# Patient Record
Sex: FEMALE | Race: WHITE | NOT HISPANIC OR LATINO
[De-identification: names, ages, dates, MRNs, and addresses within clinical notes are randomized per-mention and may not be internally consistent; named-entity substitution may affect disease eponyms.]

---

## 2017-09-12 ENCOUNTER — RESULT REVIEW (OUTPATIENT)
Age: 47
End: 2017-09-12

## 2018-10-09 ENCOUNTER — OUTPATIENT (OUTPATIENT)
Dept: OUTPATIENT SERVICES | Facility: HOSPITAL | Age: 48
LOS: 1 days | Discharge: HOME | End: 2018-10-09
Payer: COMMERCIAL

## 2018-10-09 VITALS
OXYGEN SATURATION: 99 % | RESPIRATION RATE: 18 BRPM | SYSTOLIC BLOOD PRESSURE: 120 MMHG | HEART RATE: 83 BPM | DIASTOLIC BLOOD PRESSURE: 85 MMHG | HEIGHT: 67 IN | WEIGHT: 187.17 LBS | TEMPERATURE: 98 F

## 2018-10-09 DIAGNOSIS — G95.89 OTHER SPECIFIED DISEASES OF SPINAL CORD: ICD-10-CM

## 2018-10-09 DIAGNOSIS — Z01.818 ENCOUNTER FOR OTHER PREPROCEDURAL EXAMINATION: ICD-10-CM

## 2018-10-09 DIAGNOSIS — Z90.49 ACQUIRED ABSENCE OF OTHER SPECIFIED PARTS OF DIGESTIVE TRACT: Chronic | ICD-10-CM

## 2018-10-09 DIAGNOSIS — J45.909 UNSPECIFIED ASTHMA, UNCOMPLICATED: ICD-10-CM

## 2018-10-09 DIAGNOSIS — Z98.890 OTHER SPECIFIED POSTPROCEDURAL STATES: Chronic | ICD-10-CM

## 2018-10-09 LAB
ANION GAP SERPL CALC-SCNC: 11 MMOL/L — SIGNIFICANT CHANGE UP (ref 5–17)
APTT BLD: 32.2 SEC — SIGNIFICANT CHANGE UP (ref 27.5–37.4)
BASOPHILS # BLD AUTO: 0.02 K/UL — SIGNIFICANT CHANGE UP (ref 0–0.2)
BASOPHILS NFR BLD AUTO: 0.2 % — SIGNIFICANT CHANGE UP (ref 0–2)
BLD GP AB SCN SERPL QL: SIGNIFICANT CHANGE UP
BUN SERPL-MCNC: 11 MG/DL — SIGNIFICANT CHANGE UP (ref 7–23)
CALCIUM SERPL-MCNC: 8.5 MG/DL — SIGNIFICANT CHANGE UP (ref 8.5–10.1)
CHLORIDE SERPL-SCNC: 106 MMOL/L — SIGNIFICANT CHANGE UP (ref 96–108)
CO2 SERPL-SCNC: 23 MMOL/L — SIGNIFICANT CHANGE UP (ref 22–31)
CREAT SERPL-MCNC: 0.58 MG/DL — SIGNIFICANT CHANGE UP (ref 0.5–1.3)
EOSINOPHIL # BLD AUTO: 0.25 K/UL — SIGNIFICANT CHANGE UP (ref 0–0.5)
EOSINOPHIL NFR BLD AUTO: 3 % — SIGNIFICANT CHANGE UP (ref 0–6)
GLUCOSE SERPL-MCNC: 104 MG/DL — HIGH (ref 70–99)
HBA1C BLD-MCNC: 5.6 % — SIGNIFICANT CHANGE UP (ref 4–5.6)
HCG UR QL: NEGATIVE — SIGNIFICANT CHANGE UP
HCT VFR BLD CALC: 39.3 % — SIGNIFICANT CHANGE UP (ref 34.5–45)
HGB BLD-MCNC: 13 G/DL — SIGNIFICANT CHANGE UP (ref 11.5–15.5)
IMM GRANULOCYTES NFR BLD AUTO: 0.1 % — SIGNIFICANT CHANGE UP (ref 0–1.5)
INR BLD: 0.96 RATIO — SIGNIFICANT CHANGE UP (ref 0.88–1.16)
LYMPHOCYTES # BLD AUTO: 1.58 K/UL — SIGNIFICANT CHANGE UP (ref 1–3.3)
LYMPHOCYTES # BLD AUTO: 19.1 % — SIGNIFICANT CHANGE UP (ref 13–44)
MCHC RBC-ENTMCNC: 32.5 PG — SIGNIFICANT CHANGE UP (ref 27–34)
MCHC RBC-ENTMCNC: 33.1 GM/DL — SIGNIFICANT CHANGE UP (ref 32–36)
MCV RBC AUTO: 98.3 FL — SIGNIFICANT CHANGE UP (ref 80–100)
MONOCYTES # BLD AUTO: 0.43 K/UL — SIGNIFICANT CHANGE UP (ref 0–0.9)
MONOCYTES NFR BLD AUTO: 5.2 % — SIGNIFICANT CHANGE UP (ref 2–14)
MRSA PCR RESULT.: SIGNIFICANT CHANGE UP
NEUTROPHILS # BLD AUTO: 5.97 K/UL — SIGNIFICANT CHANGE UP (ref 1.8–7.4)
NEUTROPHILS NFR BLD AUTO: 72.4 % — SIGNIFICANT CHANGE UP (ref 43–77)
PLATELET # BLD AUTO: 456 K/UL — HIGH (ref 150–400)
POTASSIUM SERPL-MCNC: 3.9 MMOL/L — SIGNIFICANT CHANGE UP (ref 3.5–5.3)
POTASSIUM SERPL-SCNC: 3.9 MMOL/L — SIGNIFICANT CHANGE UP (ref 3.5–5.3)
PROTHROM AB SERPL-ACNC: 10.5 SEC — SIGNIFICANT CHANGE UP (ref 9.8–12.7)
RBC # BLD: 4 M/UL — SIGNIFICANT CHANGE UP (ref 3.8–5.2)
RBC # FLD: 12.5 % — SIGNIFICANT CHANGE UP (ref 10.3–14.5)
S AUREUS DNA NOSE QL NAA+PROBE: SIGNIFICANT CHANGE UP
SODIUM SERPL-SCNC: 140 MMOL/L — SIGNIFICANT CHANGE UP (ref 135–145)
WBC # BLD: 8.26 K/UL — SIGNIFICANT CHANGE UP (ref 3.8–10.5)
WBC # FLD AUTO: 8.26 K/UL — SIGNIFICANT CHANGE UP (ref 3.8–10.5)

## 2018-10-09 PROCEDURE — 93010 ELECTROCARDIOGRAM REPORT: CPT | Mod: NC

## 2018-10-09 RX ORDER — MUPIROCIN 20 MG/G
1 OINTMENT TOPICAL
Qty: 22 | Refills: 0 | OUTPATIENT
Start: 2018-10-09 | End: 2018-10-13

## 2018-10-09 NOTE — H&P PST ADULT - NSANTHOSAYNRD_GEN_A_CORE
No. PATITO screening performed.  STOP BANG Legend: 0-2 = LOW Risk; 3-4 = INTERMEDIATE Risk; 5-8 = HIGH Risk

## 2018-10-09 NOTE — H&P PST ADULT - HISTORY OF PRESENT ILLNESS
48F no pmhx c/o b/l hand numbness here for PST for scheduled Anterior cervical discectomy 48F pmh asthma, c/o b/l hand numbness here for PST for scheduled Anterior cervical discectomy

## 2018-10-09 NOTE — H&P PST ADULT - ASSESSMENT
48F pmh asthma, c/o b/l hand numbness here for PST for scheduled Anterior cervical discectomy  CAPRINI SCORE    AGE RELATED RISK FACTORS                                                       MOBILITY RELATED FACTORS  [x ] Age 41-60 years                                            (1 Point)                  [ ] Bed rest                                                        (1 Point)  [ ] Age: 61-74 years                                           (2 Points)                [ ] Plaster cast                                                   (2 Points)  [ ] Age= 75 years                                              (3 Points)                 [ ] Bed bound for more than 72 hours                   (2 Points)    DISEASE RELATED RISK FACTORS                                               GENDER SPECIFIC FACTORS  [ ] Edema in the lower extremities                       (1 Point)                  [ ] Pregnancy                                                     (1 Point)  [ ] Varicose veins                                               (1 Point)                  [ ] Post-partum < 6 weeks                                   (1 Point)             [x ] BMI > 25 Kg/m2                                            (1 Point)                  [ ] Hormonal therapy  or oral contraception            (1 Point)                 [ ] Sepsis (in the previous month)                        (1 Point)                  [ ] History of pregnancy complications  [ ] Pneumonia or serious lung disease                                               [ ] Unexplained or recurrent                       (1 Point)           (in the previous month)                               (1 Point)  [ ] Abnormal pulmonary function test                     (1 Point)                 SURGERY RELATED RISK FACTORS  [ ] Acute myocardial infarction                              (1 Point)                 [ ]  Section                                            (1 Point)  [ ] Congestive heart failure (in the previous month)  (1 Point)                 [ ] Minor surgery                                                 (1 Point)   [ ] Inflammatory bowel disease                             (1 Point)                 [ ] Arthroscopic surgery                                        (2 Points)  [ ] Central venous access                                    (2 Points)                [x ] General surgery lasting more than 45 minutes   (2 Points)       [ ] Stroke (in the previous month)                          (5 Points)               [ ] Elective arthroplasty                                        (5 Points)                                                                                                                                               HEMATOLOGY RELATED FACTORS                                                 TRAUMA RELATED RISK FACTORS  [ ] Prior episodes of VTE                                     (3 Points)                 [ ] Fracture of the hip, pelvis, or leg                       (5 Points)  [ ] Positive family history for VTE                         (3 Points)                 [ ] Acute spinal cord injury (in the previous month)  (5 Points)  [ ] Prothrombin 70548 A                                      (3 Points)                 [ ] Paralysis  (less than 1 month)                          (5 Points)  [ ] Factor V Leiden                                             (3 Points)                 [ ] Multiple Trauma within 1 month                         (5 Points)  [ ] Lupus anticoagulants                                     (3 Points)                                                           [ ] Anticardiolipin antibodies                                (3 Points)                                                       [ ] High homocysteine in the blood                      (3 Points)                                             [ ] Other congenital or acquired thrombophilia       (3 Points)                                                [ ] Heparin induced thrombocytopenia                  (3 Points)                                          Total Score [       4   ]

## 2018-10-10 ENCOUNTER — TRANSCRIPTION ENCOUNTER (OUTPATIENT)
Age: 48
End: 2018-10-10

## 2018-10-11 ENCOUNTER — INPATIENT (INPATIENT)
Facility: HOSPITAL | Age: 48
LOS: 0 days | Discharge: HOME | End: 2018-10-12
Attending: ORTHOPAEDIC SURGERY | Admitting: ORTHOPAEDIC SURGERY

## 2018-10-11 ENCOUNTER — TRANSCRIPTION ENCOUNTER (OUTPATIENT)
Age: 48
End: 2018-10-11

## 2018-10-11 VITALS
SYSTOLIC BLOOD PRESSURE: 129 MMHG | TEMPERATURE: 98 F | RESPIRATION RATE: 16 BRPM | DIASTOLIC BLOOD PRESSURE: 82 MMHG | HEIGHT: 67 IN | WEIGHT: 187.17 LBS | OXYGEN SATURATION: 97 % | HEART RATE: 93 BPM

## 2018-10-11 DIAGNOSIS — Z90.49 ACQUIRED ABSENCE OF OTHER SPECIFIED PARTS OF DIGESTIVE TRACT: Chronic | ICD-10-CM

## 2018-10-11 DIAGNOSIS — Z98.890 OTHER SPECIFIED POSTPROCEDURAL STATES: Chronic | ICD-10-CM

## 2018-10-11 LAB
ANION GAP SERPL CALC-SCNC: 10 MMOL/L — SIGNIFICANT CHANGE UP (ref 5–17)
BASOPHILS # BLD AUTO: 0.03 K/UL — SIGNIFICANT CHANGE UP (ref 0–0.2)
BASOPHILS NFR BLD AUTO: 0.2 % — SIGNIFICANT CHANGE UP (ref 0–2)
BLD GP AB SCN SERPL QL: SIGNIFICANT CHANGE UP
BUN SERPL-MCNC: 10 MG/DL — SIGNIFICANT CHANGE UP (ref 7–23)
CALCIUM SERPL-MCNC: 8.1 MG/DL — LOW (ref 8.5–10.1)
CHLORIDE SERPL-SCNC: 109 MMOL/L — HIGH (ref 96–108)
CO2 SERPL-SCNC: 24 MMOL/L — SIGNIFICANT CHANGE UP (ref 22–31)
CREAT SERPL-MCNC: 0.84 MG/DL — SIGNIFICANT CHANGE UP (ref 0.5–1.3)
EOSINOPHIL # BLD AUTO: 0.06 K/UL — SIGNIFICANT CHANGE UP (ref 0–0.5)
EOSINOPHIL NFR BLD AUTO: 0.4 % — SIGNIFICANT CHANGE UP (ref 0–6)
GLUCOSE SERPL-MCNC: 127 MG/DL — HIGH (ref 70–99)
HCT VFR BLD CALC: 37.8 % — SIGNIFICANT CHANGE UP (ref 34.5–45)
HGB BLD-MCNC: 12.7 G/DL — SIGNIFICANT CHANGE UP (ref 11.5–15.5)
IMM GRANULOCYTES NFR BLD AUTO: 0.5 % — SIGNIFICANT CHANGE UP (ref 0–1.5)
LYMPHOCYTES # BLD AUTO: 0.81 K/UL — LOW (ref 1–3.3)
LYMPHOCYTES # BLD AUTO: 5.4 % — LOW (ref 13–44)
MCHC RBC-ENTMCNC: 32.3 PG — SIGNIFICANT CHANGE UP (ref 27–34)
MCHC RBC-ENTMCNC: 33.6 GM/DL — SIGNIFICANT CHANGE UP (ref 32–36)
MCV RBC AUTO: 96.2 FL — SIGNIFICANT CHANGE UP (ref 80–100)
MONOCYTES # BLD AUTO: 0.19 K/UL — SIGNIFICANT CHANGE UP (ref 0–0.9)
MONOCYTES NFR BLD AUTO: 1.3 % — LOW (ref 2–14)
NEUTROPHILS # BLD AUTO: 13.97 K/UL — HIGH (ref 1.8–7.4)
NEUTROPHILS NFR BLD AUTO: 92.2 % — HIGH (ref 43–77)
PLATELET # BLD AUTO: 419 K/UL — HIGH (ref 150–400)
POTASSIUM SERPL-MCNC: 3.9 MMOL/L — SIGNIFICANT CHANGE UP (ref 3.5–5.3)
POTASSIUM SERPL-SCNC: 3.9 MMOL/L — SIGNIFICANT CHANGE UP (ref 3.5–5.3)
RBC # BLD: 3.93 M/UL — SIGNIFICANT CHANGE UP (ref 3.8–5.2)
RBC # FLD: 11.9 % — SIGNIFICANT CHANGE UP (ref 10.3–14.5)
SODIUM SERPL-SCNC: 143 MMOL/L — SIGNIFICANT CHANGE UP (ref 135–145)
WBC # BLD: 15.13 K/UL — HIGH (ref 3.8–10.5)
WBC # FLD AUTO: 15.13 K/UL — HIGH (ref 3.8–10.5)

## 2018-10-11 RX ORDER — MORPHINE SULFATE 50 MG/1
4 CAPSULE, EXTENDED RELEASE ORAL
Qty: 0 | Refills: 0 | Status: DISCONTINUED | OUTPATIENT
Start: 2018-10-11 | End: 2018-10-11

## 2018-10-11 RX ORDER — OXYCODONE AND ACETAMINOPHEN 5; 325 MG/1; MG/1
1 TABLET ORAL EVERY 4 HOURS
Qty: 0 | Refills: 0 | Status: DISCONTINUED | OUTPATIENT
Start: 2018-10-11 | End: 2018-10-11

## 2018-10-11 RX ORDER — ASCORBIC ACID 60 MG
500 TABLET,CHEWABLE ORAL
Qty: 0 | Refills: 0 | Status: DISCONTINUED | OUTPATIENT
Start: 2018-10-11 | End: 2018-10-12

## 2018-10-11 RX ORDER — FOLIC ACID 0.8 MG
1 TABLET ORAL DAILY
Qty: 0 | Refills: 0 | Status: DISCONTINUED | OUTPATIENT
Start: 2018-10-11 | End: 2018-10-12

## 2018-10-11 RX ORDER — ACETAMINOPHEN 500 MG
1000 TABLET ORAL ONCE
Qty: 0 | Refills: 0 | Status: COMPLETED | OUTPATIENT
Start: 2018-10-11 | End: 2018-10-11

## 2018-10-11 RX ORDER — SODIUM CHLORIDE 9 MG/ML
1000 INJECTION, SOLUTION INTRAVENOUS
Qty: 0 | Refills: 0 | Status: DISCONTINUED | OUTPATIENT
Start: 2018-10-11 | End: 2018-10-11

## 2018-10-11 RX ORDER — ONDANSETRON 8 MG/1
4 TABLET, FILM COATED ORAL ONCE
Qty: 0 | Refills: 0 | Status: DISCONTINUED | OUTPATIENT
Start: 2018-10-11 | End: 2018-10-11

## 2018-10-11 RX ORDER — OXYCODONE HYDROCHLORIDE 5 MG/1
5 TABLET ORAL EVERY 4 HOURS
Qty: 0 | Refills: 0 | Status: DISCONTINUED | OUTPATIENT
Start: 2018-10-11 | End: 2018-10-12

## 2018-10-11 RX ORDER — LORATADINE 10 MG/1
10 TABLET ORAL DAILY
Qty: 0 | Refills: 0 | Status: DISCONTINUED | OUTPATIENT
Start: 2018-10-11 | End: 2018-10-12

## 2018-10-11 RX ORDER — OXYCODONE HYDROCHLORIDE 5 MG/1
10 TABLET ORAL EVERY 4 HOURS
Qty: 0 | Refills: 0 | Status: DISCONTINUED | OUTPATIENT
Start: 2018-10-11 | End: 2018-10-12

## 2018-10-11 RX ORDER — CEFAZOLIN SODIUM 1 G
2000 VIAL (EA) INJECTION EVERY 8 HOURS
Qty: 0 | Refills: 0 | Status: COMPLETED | OUTPATIENT
Start: 2018-10-11 | End: 2018-10-12

## 2018-10-11 RX ORDER — ALBUTEROL 90 UG/1
1 AEROSOL, METERED ORAL EVERY 6 HOURS
Qty: 0 | Refills: 0 | Status: DISCONTINUED | OUTPATIENT
Start: 2018-10-11 | End: 2018-10-12

## 2018-10-11 RX ORDER — BACITRACIN ZINC 500 UNIT/G
1 OINTMENT IN PACKET (EA) TOPICAL
Qty: 0 | Refills: 0 | Status: DISCONTINUED | OUTPATIENT
Start: 2018-10-11 | End: 2018-10-12

## 2018-10-11 RX ORDER — HYDROMORPHONE HYDROCHLORIDE 2 MG/ML
0.5 INJECTION INTRAMUSCULAR; INTRAVENOUS; SUBCUTANEOUS
Qty: 0 | Refills: 0 | Status: DISCONTINUED | OUTPATIENT
Start: 2018-10-11 | End: 2018-10-11

## 2018-10-11 RX ORDER — BENZOCAINE AND MENTHOL 5; 1 G/100ML; G/100ML
1 LIQUID ORAL
Qty: 0 | Refills: 0 | Status: DISCONTINUED | OUTPATIENT
Start: 2018-10-11 | End: 2018-10-12

## 2018-10-11 RX ORDER — INFLUENZA VIRUS VACCINE 15; 15; 15; 15 UG/.5ML; UG/.5ML; UG/.5ML; UG/.5ML
0.5 SUSPENSION INTRAMUSCULAR ONCE
Qty: 0 | Refills: 0 | Status: COMPLETED | OUTPATIENT
Start: 2018-10-11 | End: 2018-10-11

## 2018-10-11 RX ORDER — OXYCODONE AND ACETAMINOPHEN 5; 325 MG/1; MG/1
2 TABLET ORAL EVERY 6 HOURS
Qty: 0 | Refills: 0 | Status: DISCONTINUED | OUTPATIENT
Start: 2018-10-11 | End: 2018-10-11

## 2018-10-11 RX ORDER — MORPHINE SULFATE 50 MG/1
4 CAPSULE, EXTENDED RELEASE ORAL EVERY 4 HOURS
Qty: 0 | Refills: 0 | Status: DISCONTINUED | OUTPATIENT
Start: 2018-10-11 | End: 2018-10-12

## 2018-10-11 RX ORDER — ACETAMINOPHEN 500 MG
650 TABLET ORAL EVERY 6 HOURS
Qty: 0 | Refills: 0 | Status: DISCONTINUED | OUTPATIENT
Start: 2018-10-11 | End: 2018-10-12

## 2018-10-11 RX ORDER — HYDROMORPHONE HYDROCHLORIDE 2 MG/ML
0.25 INJECTION INTRAMUSCULAR; INTRAVENOUS; SUBCUTANEOUS
Qty: 0 | Refills: 0 | Status: DISCONTINUED | OUTPATIENT
Start: 2018-10-11 | End: 2018-10-11

## 2018-10-11 RX ORDER — DOCUSATE SODIUM 100 MG
100 CAPSULE ORAL THREE TIMES A DAY
Qty: 0 | Refills: 0 | Status: DISCONTINUED | OUTPATIENT
Start: 2018-10-11 | End: 2018-10-12

## 2018-10-11 RX ORDER — CYCLOBENZAPRINE HYDROCHLORIDE 10 MG/1
5 TABLET, FILM COATED ORAL THREE TIMES A DAY
Qty: 0 | Refills: 0 | Status: DISCONTINUED | OUTPATIENT
Start: 2018-10-11 | End: 2018-10-12

## 2018-10-11 RX ORDER — SODIUM CHLORIDE 9 MG/ML
1000 INJECTION, SOLUTION INTRAVENOUS
Qty: 0 | Refills: 0 | Status: DISCONTINUED | OUTPATIENT
Start: 2018-10-11 | End: 2018-10-12

## 2018-10-11 RX ADMIN — Medication 400 MILLIGRAM(S): at 13:05

## 2018-10-11 RX ADMIN — MORPHINE SULFATE 4 MILLIGRAM(S): 50 CAPSULE, EXTENDED RELEASE ORAL at 21:52

## 2018-10-11 RX ADMIN — SODIUM CHLORIDE 100 MILLILITER(S): 9 INJECTION, SOLUTION INTRAVENOUS at 12:53

## 2018-10-11 RX ADMIN — Medication 650 MILLIGRAM(S): at 17:37

## 2018-10-11 RX ADMIN — Medication 100 MILLIGRAM(S): at 16:19

## 2018-10-11 RX ADMIN — Medication 500 MILLIGRAM(S): at 17:35

## 2018-10-11 RX ADMIN — HYDROMORPHONE HYDROCHLORIDE 0.5 MILLIGRAM(S): 2 INJECTION INTRAMUSCULAR; INTRAVENOUS; SUBCUTANEOUS at 13:05

## 2018-10-11 RX ADMIN — Medication 1 APPLICATION(S): at 17:35

## 2018-10-11 RX ADMIN — HYDROMORPHONE HYDROCHLORIDE 0.5 MILLIGRAM(S): 2 INJECTION INTRAMUSCULAR; INTRAVENOUS; SUBCUTANEOUS at 12:53

## 2018-10-11 RX ADMIN — Medication 650 MILLIGRAM(S): at 18:30

## 2018-10-11 RX ADMIN — MORPHINE SULFATE 4 MILLIGRAM(S): 50 CAPSULE, EXTENDED RELEASE ORAL at 16:19

## 2018-10-11 RX ADMIN — ALBUTEROL 1 PUFF(S): 90 AEROSOL, METERED ORAL at 23:44

## 2018-10-11 RX ADMIN — OXYCODONE HYDROCHLORIDE 10 MILLIGRAM(S): 5 TABLET ORAL at 19:51

## 2018-10-11 RX ADMIN — OXYCODONE HYDROCHLORIDE 10 MILLIGRAM(S): 5 TABLET ORAL at 20:51

## 2018-10-11 RX ADMIN — HYDROMORPHONE HYDROCHLORIDE 0.5 MILLIGRAM(S): 2 INJECTION INTRAMUSCULAR; INTRAVENOUS; SUBCUTANEOUS at 12:25

## 2018-10-11 RX ADMIN — SODIUM CHLORIDE 75 MILLILITER(S): 9 INJECTION, SOLUTION INTRAVENOUS at 17:44

## 2018-10-11 RX ADMIN — MORPHINE SULFATE 4 MILLIGRAM(S): 50 CAPSULE, EXTENDED RELEASE ORAL at 16:35

## 2018-10-11 RX ADMIN — MORPHINE SULFATE 4 MILLIGRAM(S): 50 CAPSULE, EXTENDED RELEASE ORAL at 22:07

## 2018-10-11 NOTE — DISCHARGE NOTE ADULT - CARE PLAN
Principal Discharge DX:	Cervical radiculopathy  Goal:	improved function, decreased pain.  Assessment and plan of treatment:	Keep Dressing Clean, Dry and Intact. May shower with Dressing starting 5th day post op. Please do not scrub, soak, peel or pick at the  dressing. No creams, lotions, or oils over dressing. May shower and let water run over incision, no baths. Pat dry once out of shower. Dressing to be removed in office at follow up visit in 2 weeks. Aspen collar at times.   Incentive spirometer Principal Discharge DX:	Cervical radiculopathy  Goal:	improved function, decreased pain.  Assessment and plan of treatment:	Keep Dressing Clean, Dry and Intact. May shower with Dressing starting 5th day post op. Please do not scrub, soak, peel or pick at the  dressing. No creams, lotions, or oils over dressing. May shower and let water run over incision, no baths. Pat dry once out of shower. Dressing to be removed in office at follow up visit in 10-14 days. [place] collar at times.   Incentive spirometer

## 2018-10-11 NOTE — DISCHARGE NOTE ADULT - NS AS ACTIVITY OBS
No Heavy lifting/straining/Do not make important decisions/Do not drive or operate machinery/No bending/lifting/twisting/pulling/pushing/pulling/carrying until approved  by dr marc. No blood thinner medicine unless approved by dr marc (such as aspirin, alleve, naproxen etc)/Walking-Outdoors allowed/Stairs allowed/Walking-Indoors allowed

## 2018-10-11 NOTE — DISCHARGE NOTE ADULT - MEDICATION SUMMARY - MEDICATIONS TO STOP TAKING
I will STOP taking the medications listed below when I get home from the hospital:    mupirocin 2% topical ointment  -- Apply on skin to affected area 2 times a day   intranasaly  -- For external use only.

## 2018-10-11 NOTE — DISCHARGE NOTE ADULT - HOSPITAL COURSE
48yFemale with history of cervical radiculopathy presenting for ACDF C5-7 by Dr Edwardo Joseph on 10/11/2018. Risk and benefits of surgery were explained to the patient.The patient understood and agreed to proceed with surgery. Patient underwent the procedure with no intraoperative complications. Pt was brought in stable condition to the PACU. Once stable in PACU, pt was brought to the floor. During hospital stay pt was followed by Medicine, [social work or home care] during this admission. Pt had an uneventful hospital course. Pt is stable for discharge to [rehab/home] on POD#[ ] 48yFemale with history of cervical radiculopathy presenting for ACDF C5-7 by Dr Edwardo Joseph on 10/11/2018. Risk and benefits of surgery were explained to the patient. The patient understood and agreed to proceed with surgery. Patient underwent the procedure with no intraoperative complications. Pt was brought in stable condition to the PACU. Once stable in PACU, pt was brought to the floor. During hospital stay pt was followed by Medicine Home care during this admission. Pt had an uneventful hospital course. Pt is stable for discharge to home on POD#1

## 2018-10-11 NOTE — DISCHARGE NOTE ADULT - ADDITIONAL INSTRUCTIONS
Follow up with dr Joseph in 2 weeks - Call for appointment    Call your surgeon if there is increased redness/pain/drainage or fever. Return to ER for shortness of breath/calf tenderness/chest pain. Follow up with dr Joseph in 10-14 weeks - Call for appointment    Call your surgeon if there is increased redness/pain/drainage or fever. Return to ER for shortness of breath/calf tenderness/chest pain.

## 2018-10-11 NOTE — DISCHARGE NOTE ADULT - MEDICATION SUMMARY - MEDICATIONS TO TAKE
I will START or STAY ON the medications listed below when I get home from the hospital:    acetaminophen 325 mg oral tablet  -- 2 tab(s) by mouth every 8 hours as needed for pain  -- Indication: For Spine Surgery    oxyCODONE 5 mg oral tablet  -- 1-2 tab(s) by mouth every 4 hours, As needed, mild-moderate pain (0-5) MDD:12tablets  -- Indication: For Spine surgery    Allegra  -- 1 tab(s) by mouth once a day  -- Indication: For Home medication    albuterol CFC free 90 mcg/inh inhalation aerosol  -- 1 puff(s) inhaled every 6 hours follow up with your doctor in this week to reeval to determine duration of treatment  -- Indication: For Home medication    bacitracin 500 units/g topical ointment  -- 1 application on skin 2 times a day to scalp until healed  -- Indication: For Spine Surgery    docusate sodium 100 mg oral capsule  -- 1 cap(s) by mouth 3 times a day  -- Indication: For Spine Surgery    Multiple Vitamins oral tablet  -- 1 tab(s) by mouth once a day  -- Indication: For Spine Surgery    ascorbic acid 500 mg oral tablet  -- 1 tab(s) by mouth 2 times a day  -- Indication: For Spien Surgery

## 2018-10-11 NOTE — PROGRESS NOTE ADULT - SUBJECTIVE AND OBJECTIVE BOX
48yFemale s/p ACDF C5-7 POD#0. Pt seen and examined in NAD. Pt denies any new complaints. Pt denies CP/SOB/N/V/D/numbness/tingling/bowel or bladder dysfunction.   C/O sore throat and shoulder muscle pain.   PE:   AAOX3  Scalp: occipital aspect abrasion with mostly dried blood  Neck: Cervical collar in place. Dressing c/d/i +PHILIP serosanguinous  B/L UE: Skin intact. +ROM shoulder/elbow/wrist/fingers. +ok/thumbsup/fingercross signs.  strength: 5/5.  RP2+ NVI.   B/L LEs: ankle dorsi/plantarflexion intact. sensation intact. No calf tenderness B/L                           12.7   15.13 )-----------( 419      ( 11 Oct 2018 12:54 )             37.8       10-11    143  |  109<H>  |  10  ----------------------------<  127<H>  3.9   |  24  |  0.84    Ca    8.1<L>      11 Oct 2018 12:55          A/P: 48yFemale s/p ACDF c5-7 POD#0  MOnitor PHILIP drain output  Pain control: cyclobenzaprine added.  Sore throat: liquid diet for now, benzocaine losenges ordered  Neck collar: Aspen collar ordered from orthotist, pending. pt and nurse aware  PT: WBAT - spinal precautions   DVT ppx: SCDs - no NSAIDS  counseled  Scalp abrasion: bacitracin BID. From intraop neuromonitoring.   Wound care, Isometric exercises, incentive spirometry   Discharge: planning - home tomorrow  All the above discussed and understood by pt

## 2018-10-11 NOTE — DISCHARGE NOTE ADULT - CARE PROVIDER_API CALL
Edwardo Joseph), Orthopaedic Surgery  [address] [place] [address]  Phone: [phone]  Fax: [phone]

## 2018-10-11 NOTE — DISCHARGE NOTE ADULT - PLAN OF CARE
improved function, decreased pain. Keep Dressing Clean, Dry and Intact. May shower with Dressing starting 5th day post op. Please do not scrub, soak, peel or pick at the  dressing. No creams, lotions, or oils over dressing. May shower and let water run over incision, no baths. Pat dry once out of shower. Dressing to be removed in office at follow up visit in 2 weeks. Aspen collar at times.   Incentive spirometer Keep Dressing Clean, Dry and Intact. May shower with Dressing starting 5th day post op. Please do not scrub, soak, peel or pick at the  dressing. No creams, lotions, or oils over dressing. May shower and let water run over incision, no baths. Pat dry once out of shower. Dressing to be removed in office at follow up visit in 10-14 days. [place] collar at times.   Incentive spirometer

## 2018-10-11 NOTE — DISCHARGE NOTE ADULT - PATIENT PORTAL LINK FT
You can access the [facility] Portal, offered by [facility], by registering with the following website: [email or web address]

## 2018-10-11 NOTE — BRIEF OPERATIVE NOTE - PROCEDURE
<<-----Click on this checkbox to enter Procedure Anterior cervical discectomy with fusion 2 levels  10/11/2018  C5-7  Active  SYULE

## 2018-10-12 VITALS
RESPIRATION RATE: 16 BRPM | HEART RATE: 81 BPM | TEMPERATURE: 98 F | OXYGEN SATURATION: 100 % | SYSTOLIC BLOOD PRESSURE: 135 MMHG | DIASTOLIC BLOOD PRESSURE: 61 MMHG

## 2018-10-12 LAB
ANION GAP SERPL CALC-SCNC: 9 MMOL/L — SIGNIFICANT CHANGE UP (ref 5–17)
BASOPHILS # BLD AUTO: 0.02 K/UL — SIGNIFICANT CHANGE UP (ref 0–0.2)
BASOPHILS NFR BLD AUTO: 0.1 % — SIGNIFICANT CHANGE UP (ref 0–2)
BUN SERPL-MCNC: 6 MG/DL — LOW (ref 7–23)
CALCIUM SERPL-MCNC: 8.7 MG/DL — SIGNIFICANT CHANGE UP (ref 8.5–10.1)
CHLORIDE SERPL-SCNC: 105 MMOL/L — SIGNIFICANT CHANGE UP (ref 96–108)
CO2 SERPL-SCNC: 27 MMOL/L — SIGNIFICANT CHANGE UP (ref 22–31)
CREAT SERPL-MCNC: 0.65 MG/DL — SIGNIFICANT CHANGE UP (ref 0.5–1.3)
EOSINOPHIL # BLD AUTO: 0.01 K/UL — SIGNIFICANT CHANGE UP (ref 0–0.5)
EOSINOPHIL NFR BLD AUTO: 0.1 % — SIGNIFICANT CHANGE UP (ref 0–6)
GLUCOSE SERPL-MCNC: 126 MG/DL — HIGH (ref 70–99)
HCT VFR BLD CALC: 36.8 % — SIGNIFICANT CHANGE UP (ref 34.5–45)
HGB BLD-MCNC: 12.1 G/DL — SIGNIFICANT CHANGE UP (ref 11.5–15.5)
IMM GRANULOCYTES NFR BLD AUTO: 0.5 % — SIGNIFICANT CHANGE UP (ref 0–1.5)
LYMPHOCYTES # BLD AUTO: 1.35 K/UL — SIGNIFICANT CHANGE UP (ref 1–3.3)
LYMPHOCYTES # BLD AUTO: 8.9 % — LOW (ref 13–44)
MCHC RBC-ENTMCNC: 32.6 PG — SIGNIFICANT CHANGE UP (ref 27–34)
MCHC RBC-ENTMCNC: 32.9 GM/DL — SIGNIFICANT CHANGE UP (ref 32–36)
MCV RBC AUTO: 99.2 FL — SIGNIFICANT CHANGE UP (ref 80–100)
MONOCYTES # BLD AUTO: 0.8 K/UL — SIGNIFICANT CHANGE UP (ref 0–0.9)
MONOCYTES NFR BLD AUTO: 5.3 % — SIGNIFICANT CHANGE UP (ref 2–14)
NEUTROPHILS # BLD AUTO: 12.98 K/UL — HIGH (ref 1.8–7.4)
NEUTROPHILS NFR BLD AUTO: 85.1 % — HIGH (ref 43–77)
PLATELET # BLD AUTO: 430 K/UL — HIGH (ref 150–400)
POTASSIUM SERPL-MCNC: 4.4 MMOL/L — SIGNIFICANT CHANGE UP (ref 3.5–5.3)
POTASSIUM SERPL-SCNC: 4.4 MMOL/L — SIGNIFICANT CHANGE UP (ref 3.5–5.3)
RBC # BLD: 3.71 M/UL — LOW (ref 3.8–5.2)
RBC # FLD: 12 % — SIGNIFICANT CHANGE UP (ref 10.3–14.5)
SODIUM SERPL-SCNC: 141 MMOL/L — SIGNIFICANT CHANGE UP (ref 135–145)
WBC # BLD: 15.23 K/UL — HIGH (ref 3.8–10.5)
WBC # FLD AUTO: 15.23 K/UL — HIGH (ref 3.8–10.5)

## 2018-10-12 RX ORDER — ASCORBIC ACID 60 MG
1 TABLET,CHEWABLE ORAL
Qty: 0 | Refills: 0 | COMMUNITY
Start: 2018-10-12

## 2018-10-12 RX ORDER — OXYCODONE HYDROCHLORIDE 5 MG/1
1 TABLET ORAL
Qty: 42 | Refills: 0 | OUTPATIENT
Start: 2018-10-12 | End: 2018-10-18

## 2018-10-12 RX ORDER — OXYCODONE HYDROCHLORIDE 5 MG/1
1 TABLET ORAL
Qty: 180 | Refills: 0 | OUTPATIENT
Start: 2018-10-12 | End: 2018-11-10

## 2018-10-12 RX ORDER — DOCUSATE SODIUM 100 MG
1 CAPSULE ORAL
Qty: 0 | Refills: 0 | COMMUNITY
Start: 2018-10-12

## 2018-10-12 RX ORDER — BACITRACIN ZINC 500 UNIT/G
1 OINTMENT IN PACKET (EA) TOPICAL
Qty: 0 | Refills: 0 | COMMUNITY
Start: 2018-10-12

## 2018-10-12 RX ORDER — ACETAMINOPHEN 500 MG
2 TABLET ORAL
Qty: 0 | Refills: 0 | COMMUNITY
Start: 2018-10-12

## 2018-10-12 RX ADMIN — Medication 650 MILLIGRAM(S): at 01:00

## 2018-10-12 RX ADMIN — OXYCODONE HYDROCHLORIDE 10 MILLIGRAM(S): 5 TABLET ORAL at 01:01

## 2018-10-12 RX ADMIN — Medication 1 TABLET(S): at 05:40

## 2018-10-12 RX ADMIN — Medication 500 MILLIGRAM(S): at 05:39

## 2018-10-12 RX ADMIN — Medication 100 MILLIGRAM(S): at 14:16

## 2018-10-12 RX ADMIN — Medication 100 MILLIGRAM(S): at 05:40

## 2018-10-12 RX ADMIN — Medication 650 MILLIGRAM(S): at 05:40

## 2018-10-12 RX ADMIN — Medication 650 MILLIGRAM(S): at 06:40

## 2018-10-12 RX ADMIN — LORATADINE 10 MILLIGRAM(S): 10 TABLET ORAL at 11:43

## 2018-10-12 RX ADMIN — OXYCODONE HYDROCHLORIDE 10 MILLIGRAM(S): 5 TABLET ORAL at 06:40

## 2018-10-12 RX ADMIN — Medication 1 TABLET(S): at 11:43

## 2018-10-12 RX ADMIN — Medication 1 TABLET(S): at 14:15

## 2018-10-12 RX ADMIN — SODIUM CHLORIDE 75 MILLILITER(S): 9 INJECTION, SOLUTION INTRAVENOUS at 05:40

## 2018-10-12 RX ADMIN — OXYCODONE HYDROCHLORIDE 10 MILLIGRAM(S): 5 TABLET ORAL at 00:24

## 2018-10-12 RX ADMIN — Medication 1 MILLIGRAM(S): at 11:43

## 2018-10-12 RX ADMIN — Medication 100 MILLIGRAM(S): at 00:23

## 2018-10-12 RX ADMIN — Medication 1 APPLICATION(S): at 05:40

## 2018-10-12 RX ADMIN — Medication 650 MILLIGRAM(S): at 00:23

## 2018-10-12 RX ADMIN — OXYCODONE HYDROCHLORIDE 10 MILLIGRAM(S): 5 TABLET ORAL at 05:40

## 2018-10-12 RX ADMIN — Medication 650 MILLIGRAM(S): at 12:35

## 2018-10-12 RX ADMIN — Medication 650 MILLIGRAM(S): at 11:43

## 2018-10-12 RX ADMIN — BENZOCAINE AND MENTHOL 1 LOZENGE: 5; 1 LIQUID ORAL at 00:25

## 2018-10-12 NOTE — PHYSICAL THERAPY INITIAL EVALUATION ADULT - GENERAL OBSERVATIONS, REHAB EVAL
Patient sat up in bed. Vital signs stable as charted. + [place] collar, + anterior neck dressing clean, dry & intact.

## 2018-10-12 NOTE — PROGRESS NOTE ADULT - SUBJECTIVE AND OBJECTIVE BOX
Pt in bed, Pt's PHILIP drain removed by myself. Pt tolerated it well. New Dressing was applied.   Pt ok to be discharged to home today as per Dr. Joseph.

## 2018-10-12 NOTE — PHYSICAL THERAPY INITIAL EVALUATION ADULT - ADDITIONAL COMMENTS
Per patient, lives in private house c 3 stair steps to enter c x2 handrail and internal stairs to 2nd floor bedroom c right handrail. Has shower stall at main level; full bath c shower/tub combo at 2nd floor. Patient was independent in all ADLs and gait prior to admission.

## 2018-10-12 NOTE — PHYSICAL THERAPY INITIAL EVALUATION ADULT - PLANNED THERAPY INTERVENTIONS, PT EVAL
lumbar stabilization/manual therapy techniques/ROM/strengthening/joint mobilization/transfer training/gait training/postural re-education/stretching/balance training

## 2018-10-12 NOTE — PROGRESS NOTE ADULT - SUBJECTIVE AND OBJECTIVE BOX
48yFemale s/p ACDF C5-7. Pt seen and examined in NAD. Pain controlled. Pt denies any new complaints. Pt denies CP/SOB/N/V/D/numbness/tingling/bowel or bladder dysfunction.     PE:   Neck: Dressing c/d/i  +PHILIP   B/L UE: Skin intact. +ROM shoulder/elbow/wrist/fingers. +ok/thumbsup/fingercross signs.  strength: 5/5.  RP2+ NVI.   B/L LE: Skin intact. +ROM hip/knee/ankle/toes. Ankle Dorsi/plantarflexion: 5/5. Calf: soft, compressible and nontender. DP/PT 2+ NVI.                             12.1   15.23 )-----------( 430      ( 12 Oct 2018 06:24 )             36.8       10-12    141  |  105  |  6<L>  ----------------------------<  126<H>  4.4   |  27  |  0.65    Ca    8.7      12 Oct 2018 06:24          A/P: 48yFemale s/p ACDF C5-7 POD#1  Pain controlled  PT: WBAT - spinal precautions   DVT ppx: SCDs   Wound care, Isometric exercises, incentive spirometry   Discharge: planning Home today once drain is out  All the above discussed and understood by pt   Pt was seen and examined by Dr. Joseph this morning

## 2018-10-12 NOTE — PHYSICAL THERAPY INITIAL EVALUATION ADULT - CRITERIA FOR SKILLED THERAPEUTIC INTERVENTIONS
impairments found/functional limitations in following categories/anticipated discharge recommendation/risk reduction/prevention/rehab potential

## 2018-10-12 NOTE — PHYSICAL THERAPY INITIAL EVALUATION ADULT - IMPAIRMENTS FOUND, PT EVAL
joint integrity and mobility/ROM/aerobic capacity/endurance/gait, locomotion, and balance/muscle strength/cranial and peripheral nerve integrity

## 2018-10-12 NOTE — PHYSICAL THERAPY INITIAL EVALUATION ADULT - PERTINENT HX OF CURRENT PROBLEM, REHAB EVAL
Patient came in for elective cervical spine surgery. . Chart reviewed and noted POD 1 ACDF C5-C7. Spinal precautions reviewed, reinforced and maintained throughout.

## 2018-10-16 DIAGNOSIS — G95.9 DISEASE OF SPINAL CORD, UNSPECIFIED: ICD-10-CM

## 2018-10-16 DIAGNOSIS — G95.89 OTHER SPECIFIED DISEASES OF SPINAL CORD: ICD-10-CM

## 2018-10-16 DIAGNOSIS — Z28.21 IMMUNIZATION NOT CARRIED OUT BECAUSE OF PATIENT REFUSAL: ICD-10-CM

## 2018-10-16 DIAGNOSIS — M48.02 SPINAL STENOSIS, CERVICAL REGION: ICD-10-CM

## 2019-04-08 ENCOUNTER — RESULT REVIEW (OUTPATIENT)
Age: 49
End: 2019-04-08

## 2020-02-10 NOTE — PATIENT PROFILE ADULT - NSPROPATIENTLACTATING_GEN_A_NUR
Spoke with avtar Florence who confirmed script sent in today was not received. Resent script to the pharmacy.   no

## 2020-08-24 NOTE — BRIEF OPERATIVE NOTE - POST-OP DX
Have Your Skin Lesions Been Treated?: not been treated
Is This A New Presentation, Or A Follow-Up?: Skin Lesions
How Severe Is Your Skin Lesion?: mild
Cervical spinal stenosis  10/11/2018  C5-7  Active  Ramon Azul

## 2021-01-24 NOTE — ASU PREOP CHECKLIST - HEIGHT IN CM
Procedure:  OPEN REDUCTION W/ INTERNAL FIXATION (ORIF) LEFT ANKLE TRIMALLEOLAR FRACTURE (Left Ankle)    Relevant Problems   Other   (+) Class 1 obesity in adult   (+) Closed trimalleolar fracture of left ankle        Physical Exam    Airway    Mallampati score: I  TM Distance: >3 FB  Neck ROM: full     Dental   Comment: Denies loose teeth,     Cardiovascular  Cardiovascular exam normal    Pulmonary  Pulmonary exam normal     Other Findings  Portions of exam deferred due to low yield and/or unknown COVID status      Anesthesia Plan  ASA Score- 2     Anesthesia Type- regional and general with ASA Monitors  Additional Monitors:   Airway Plan:           Plan Factors-Exercise tolerance (METS): >4 METS  Chart reviewed  Existing labs reviewed  Patient summary reviewed  Patient is not a current smoker  Induction- intravenous  Postoperative Plan-     Informed Consent- Anesthetic plan and risks discussed with patient  I personally reviewed this patient with the CRNA  Discussed and agreed on the Anesthesia Plan with the CRNA  Jared Machuca 170.18

## 2022-01-13 PROBLEM — Z00.00 ENCOUNTER FOR PREVENTIVE HEALTH EXAMINATION: Status: ACTIVE | Noted: 2022-01-13

## 2022-12-06 ENCOUNTER — APPOINTMENT (OUTPATIENT)
Dept: PAIN MANAGEMENT | Facility: CLINIC | Age: 52
End: 2022-12-06

## 2023-10-09 ENCOUNTER — APPOINTMENT (OUTPATIENT)
Dept: ORTHOPEDIC SURGERY | Facility: CLINIC | Age: 53
End: 2023-10-09
Payer: COMMERCIAL

## 2023-10-09 VITALS — HEIGHT: 67 IN | BODY MASS INDEX: 29.03 KG/M2 | WEIGHT: 185 LBS

## 2023-10-09 DIAGNOSIS — S33.5XXA SPRAIN OF LIGAMENTS OF LUMBAR SPINE, INITIAL ENCOUNTER: ICD-10-CM

## 2023-10-09 DIAGNOSIS — Z78.9 OTHER SPECIFIED HEALTH STATUS: ICD-10-CM

## 2023-10-09 PROCEDURE — J3490M: CUSTOM

## 2023-10-09 PROCEDURE — 99204 OFFICE O/P NEW MOD 45 MIN: CPT | Mod: 25

## 2023-10-09 PROCEDURE — 20552 NJX 1/MLT TRIGGER POINT 1/2: CPT

## 2023-10-09 PROCEDURE — 72170 X-RAY EXAM OF PELVIS: CPT

## 2023-10-09 PROCEDURE — 72110 X-RAY EXAM L-2 SPINE 4/>VWS: CPT

## 2023-10-09 RX ORDER — CYCLOBENZAPRINE HYDROCHLORIDE 10 MG/1
10 TABLET, FILM COATED ORAL
Qty: 30 | Refills: 0 | Status: ACTIVE | COMMUNITY
Start: 2023-10-09 | End: 1900-01-01

## 2023-10-12 ENCOUNTER — APPOINTMENT (OUTPATIENT)
Dept: MRI IMAGING | Facility: CLINIC | Age: 53
End: 2023-10-12
Payer: COMMERCIAL

## 2023-10-12 PROCEDURE — 72148 MRI LUMBAR SPINE W/O DYE: CPT

## 2023-10-25 ENCOUNTER — APPOINTMENT (OUTPATIENT)
Dept: ORTHOPEDIC SURGERY | Facility: CLINIC | Age: 53
End: 2023-10-25

## 2023-11-01 ENCOUNTER — APPOINTMENT (OUTPATIENT)
Dept: ORTHOPEDIC SURGERY | Facility: CLINIC | Age: 53
End: 2023-11-01
Payer: COMMERCIAL

## 2023-11-01 PROCEDURE — 99215 OFFICE O/P EST HI 40 MIN: CPT

## 2024-01-10 ENCOUNTER — APPOINTMENT (OUTPATIENT)
Dept: ORTHOPEDIC SURGERY | Facility: CLINIC | Age: 54
End: 2024-01-10
Payer: COMMERCIAL

## 2024-01-10 PROCEDURE — 96372 THER/PROPH/DIAG INJ SC/IM: CPT

## 2024-01-10 PROCEDURE — 99214 OFFICE O/P EST MOD 30 MIN: CPT | Mod: 25

## 2024-01-10 RX ORDER — GABAPENTIN 300 MG/1
300 CAPSULE ORAL
Qty: 30 | Refills: 2 | Status: ACTIVE | COMMUNITY
Start: 2024-01-10 | End: 1900-01-01

## 2024-01-10 NOTE — PROCEDURE
[Therapeutic Injection] : therapeutic injection [Left] : of the left [Other: ____] : [unfilled] [___ cc    2%] : Lidocaine ~Vcc of 2%  [___ cc    30mg] : Ketorolac (Toradol) ~Vcc of 30 mg  [Call if redness, pain or fever occur] : call if redness, pain or fever occur [Apply ice for 15min out of every hour for the next 12-24 hours as tolerated] : apply ice for 15 minutes out of every hour for the next 12-24 hours as tolerated [Previous OTC use and PT nontherapeutic] : patient has tried OTC's including aspirin, Ibuprofen, Aleve, etc or prescription NSAIDS, and/or exercises at home and/or physical therapy without satisfactory response [Patient had decreased mobility in the joint] : patient had decreased mobility in the joint [Risks, benefits, alternatives discussed / Verbal consent obtained] : the risks benefits, and alternatives have been discussed, and verbal consent was obtained

## 2024-01-11 ENCOUNTER — TRANSCRIPTION ENCOUNTER (OUTPATIENT)
Age: 54
End: 2024-01-11

## 2024-01-11 RX ORDER — TRAMADOL HYDROCHLORIDE 50 MG/1
50 TABLET, COATED ORAL 3 TIMES DAILY
Qty: 40 | Refills: 0 | Status: ACTIVE | COMMUNITY
Start: 2024-01-11 | End: 1900-01-01

## 2024-01-11 NOTE — DISCUSSION/SUMMARY
[Medication Risks Reviewed] : Medication risks reviewed [de-identified] : reviewed the case and the imaging with her - interesting change in the symtpoms - hard to say if hip issue versus spine mediated radiculopathy  L deltoid toradol injection tolerated well MRI R hip indicated in order to help sort this out could be L3-4 right sided radiucopathy? gabapentin script with precautions   this may be a seperate hip problem independent from the back? if no signficaint hip finding consider right sided L3-4 LESI   ttoday his doesnt look like an L5-S1 issue though she does have L5-S1 issues as well

## 2024-01-11 NOTE — HISTORY OF PRESENT ILLNESS
[Lower back] : lower back [8] : 8 [5] : 5 [Tightness] : tightness [Full time] : Work status: full time [de-identified] : 10/9/23:  54 y/o female with LBP for years Worse for past 2 weeks Pain across low back worse with standing. Radiation to buttocks with numbness. Pain with standing and diff standing at times.  Right sided weakness at times, No .BB dysfucntion, no foot drop.  Had neck surgery with me a couple of year ago    Aleve with min relief.  DIsectomy in past.  Accupunture with temp improvement in past.  PT in past, none recently.  PMHx denies Denies CA hx  xrays today: L spine - spondylosis  AP PELVIS- negative   11/01/23 here to review mri results on the lower spine - plan at last was "reviewed the case  lumbar spondylosis  discussion of tx optoins  indicated for MRI L spine PT indicated  flexeril  TPI tolerated well" - pain in the lower back  No PT so far yoga  MRI L-spine 10/12/23 See attached   01/10/24: follow up lumbar spine; pain and spasms to the right side of the back radiating into the right groin with "nerve" type pain that travels into the right thigh area.  Reports severe pain, difficuly working over the last 2 weeks, She has been attending PT with some relief. This pain is different than the last visit    [] : no [FreeTextEntry5] : no injury  [FreeTextEntry6] : feels pressure  [de-identified] : 2006 [de-identified] : [facility] done [facility] [de-identified] : aleve, ice, heat and rest tpi pt

## 2024-01-11 NOTE — PHYSICAL EXAM
[Flexion] : flexion [Extension] : extension [Bending to left] : bending to left [Bending to right] : bending to right [Right] : right hip [5___] : adduction 5[unfilled]/5 [] : light touch is not intact throughout both lower extremities

## 2024-01-17 ENCOUNTER — APPOINTMENT (OUTPATIENT)
Dept: MRI IMAGING | Facility: CLINIC | Age: 54
End: 2024-01-17
Payer: COMMERCIAL

## 2024-01-17 PROCEDURE — 73721 MRI JNT OF LWR EXTRE W/O DYE: CPT | Mod: RT

## 2024-01-18 ENCOUNTER — APPOINTMENT (OUTPATIENT)
Dept: ORTHOPEDIC SURGERY | Facility: CLINIC | Age: 54
End: 2024-01-18
Payer: COMMERCIAL

## 2024-01-18 VITALS — WEIGHT: 185 LBS | HEIGHT: 67 IN | BODY MASS INDEX: 29.03 KG/M2

## 2024-01-18 DIAGNOSIS — M25.551 PAIN IN RIGHT HIP: ICD-10-CM

## 2024-01-18 DIAGNOSIS — M51.36 OTHER INTERVERTEBRAL DISC DEGENERATION, LUMBAR REGION: ICD-10-CM

## 2024-01-18 PROCEDURE — 99214 OFFICE O/P EST MOD 30 MIN: CPT

## 2024-01-18 RX ORDER — METHYLPREDNISOLONE 4 MG/1
4 TABLET ORAL
Qty: 1 | Refills: 0 | Status: ACTIVE | COMMUNITY
Start: 2024-01-18 | End: 1900-01-01

## 2024-01-18 NOTE — PROCEDURE
[Therapeutic Injection] : therapeutic injection [Left] : of the left [Other: ____] : [unfilled] [___ cc    2%] : Lidocaine ~Vcc of 2%  [___ cc    30mg] : Ketorolac (Toradol) ~Vcc of 30 mg  [Call if redness, pain or fever occur] : call if redness, pain or fever occur [Apply ice for 15min out of every hour for the next 12-24 hours as tolerated] : apply ice for 15 minutes out of every hour for the next 12-24 hours as tolerated [Patient had decreased mobility in the joint] : patient had decreased mobility in the joint [Previous OTC use and PT nontherapeutic] : patient has tried OTC's including aspirin, Ibuprofen, Aleve, etc or prescription NSAIDS, and/or exercises at home and/or physical therapy without satisfactory response [Risks, benefits, alternatives discussed / Verbal consent obtained] : the risks benefits, and alternatives have been discussed, and verbal consent was obtained

## 2024-01-18 NOTE — HISTORY OF PRESENT ILLNESS
[Lower back] : lower back [8] : 8 [5] : 5 [Tightness] : tightness [Full time] : Work status: full time [de-identified] : 10/9/23:  54 y/o female with LBP for years Worse for past 2 weeks Pain across low back worse with standing. Radiation to buttocks with numbness. Pain with standing and diff standing at times.  Right sided weakness at times, No .BB dysfucntion, no foot drop.  Had neck surgery with me a couple of year ago    Aleve with min relief.  DIsectomy in past.  Accupunture with temp improvement in past.  PT in past, none recently.  PMHx denies Denies CA hx  xrays today: L spine - spondylosis  AP PELVIS- negative   11/01/23 here to review mri results on the lower spine - plan at last was "reviewed the case  lumbar spondylosis  discussion of tx optoins  indicated for MRI L spine PT indicated  flexeril  TPI tolerated well" - pain in the lower back  No PT so far yoga  MRI L-spine 10/12/23 See attached   01/10/24: follow up lumbar spine; pain and spasms to the right side of the back radiating into the right groin with "nerve" type pain that travels into the right thigh area.  Reports severe pain, difficuly working over the last 2 weeks, She has been attending PT with some relief. This pain is different than the last visit   1/18/24: Here for fu  -plan at last was MRi HIP - pain in  the right anterior hip - severe   mrI R HIP - [facility] - see report   [] : no [FreeTextEntry5] : no injury  [FreeTextEntry6] : feels pressure  [de-identified] : 2005 [de-identified] : mri done at [facility] [de-identified] : aleve, ice, heat and rest tpi pt

## 2024-01-18 NOTE — DISCUSSION/SUMMARY
[Medication Risks Reviewed] : Medication risks reviewed [de-identified] : reviewed the case and the imaging with her - interesting change in the symtpoms - hard to say if hip issue versus spine mediated radiculopathy   MRI R hip reviewed and no revealing findings  could be L3-4 right sided radiucopathy? gabapentin script with precautions   if no signficaint hip finding consider right sided L3-4 LESI   today his doesnt look like an L5-S1 issue though she does have L5-S1 issues as well

## 2024-01-26 ENCOUNTER — APPOINTMENT (OUTPATIENT)
Dept: PAIN MANAGEMENT | Facility: CLINIC | Age: 54
End: 2024-01-26
Payer: COMMERCIAL

## 2024-01-26 VITALS — WEIGHT: 185 LBS | HEIGHT: 67 IN | BODY MASS INDEX: 29.03 KG/M2

## 2024-01-26 PROCEDURE — 99214 OFFICE O/P EST MOD 30 MIN: CPT

## 2024-01-26 NOTE — HISTORY OF PRESENT ILLNESS
[Lower back] : lower back [10] : 10 [8] : 8 [Sharp] : sharp [Shooting] : shooting [Constant] : constant [Household chores] : household chores [Nothing helps with pain getting better] : Nothing helps with pain getting better [Standing] : standing [Walking] : walking [FreeTextEntry1] : Initial HPI 01/26/2024: Pain started a few months ago and recently worsening. Pain is on the right lateral hip and radiates into the right groin and down the right anterior thigh to the knee described as a sharp burning pain wiht associated numbness and tingling. Also gives out with walking. Saw Dr. Joseph who recommended right L3-4 MEERA.   MRI Lumbar Spine 10/12/23 independently reviewed: L2-3 mild stenosis and right L3 impingement; L3-4 HNP with right L4 impingement.  MRI Right Hip 1/17/24 independently reviewed: mild/moderate arthritis with labral tearing Conservative Care: has been doing PT with no relief  Pain Medications: completed MDP with good relief; tylenol/aleve PRN; did not like gabapentin bc made her drowsy and didn't help; Tramadol with no relief  Past Injections: LESIs in the past  Spine surgery: Cervical and lumbar spine surgery; L5-S1 right discectomy/hemilaminectomy  Blood thinners: none [] : no [de-identified] : l mri  [FreeTextEntry7] : rt hip

## 2024-01-26 NOTE — PHYSICAL EXAM
[de-identified] : Constitutional; Appears well, no apparent distress Ability to communicate: Normal  Respiratory: non-labored breathing Skin: No rash noted Head: Normocephalic, atraumatic Neck: no visible thyroid enlargement Eyes: Extraocular movements intact Neurologic: Alert and oriented x3 Psychiatric: normal mood, affect and behavior [] : non-antalgic

## 2024-01-26 NOTE — DISCUSSION/SUMMARY
[de-identified] : After discussing various treatment options with the patient including but not limited to oral medications, physical therapy, exercise modalities as well as interventional spinal injections, we have decided with the following plan:  - Continue Home exercises, stretching, activity modification, physical therapy, and conservative care. - MRI report and/or images was reviewed and discussed with the patient. - Recommend Right L3-4, L4-5 Transforaminal Epidural Steroid Injection under fluoroscopic guidance with image. - The risks, benefits and alternatives of the proposed procedure were explained in detail with the patient. The risks outlined include but are not limited to infection, bleeding, post-dural puncture headache, nerve injury, a temporary increase in pain, failure to resolve symptoms, allergic reaction, symptom recurrence, and possible elevation of blood sugar in diabetics. All questions were answered to patient's apparent satisfaction and he/she verbalized an understanding. - Patient is presenting with acute/sub-acute radicular pain with impairment in ADLs and functionality.  The pain has not responded to conservative care including NSAID therapy and/or physical therapy.  There is no bleeding tendency, unstable medical condition, or systemic infection. - Follow up in 1-2 weeks post injection for re-evaluation.

## 2024-02-14 ENCOUNTER — APPOINTMENT (OUTPATIENT)
Age: 54
End: 2024-02-14

## 2024-02-15 ENCOUNTER — APPOINTMENT (OUTPATIENT)
Dept: PAIN MANAGEMENT | Facility: CLINIC | Age: 54
End: 2024-02-15
Payer: COMMERCIAL

## 2024-02-15 PROCEDURE — J3490M: CUSTOM

## 2024-02-15 PROCEDURE — 64483 NJX AA&/STRD TFRM EPI L/S 1: CPT | Mod: RT

## 2024-02-15 PROCEDURE — 64484 NJX AA&/STRD TFRM EPI L/S EA: CPT | Mod: 59,RT

## 2024-02-15 NOTE — PROCEDURE
[FreeTextEntry3] : Date of Service: 02/15/2024   Account: [id number]  Patient: BRENDA MARROQUIN   YOB: 1970  Age: 54 year   Surgeon:                                                         Sincere Lockhart D.O.  Assistant:                                                         None.  Pre-Operative Diagnosis:                             Lumbosacral radiculitis (M54.17)  Post-Operative Diagnosis:                           Same  Procedure:                                                      Right L3-4, L4-5 transforaminal epidural steroid injection under fluoroscopic guidance.  Anesthesia:                                                     Local with MAC   This procedure was carried out using fluoroscopic guidance.  The risks and benefits of the procedure were discussed extensively with the patient.  The consent of the patient was obtained and the following procedure was performed. The patient was placed in the prone position on the fluoroscopic table and the lumbar area was prepped and draped in a sterile fashion. A timeout was performed with all essential staff present and the site and side were verified.  The right L3-4 neural foramen was identified on right oblique "moisés dog" anatomical view at the 6 o'clock position using fluoroscopic guidance, and the area was marked. The overlying skin and subcutaneous structures were anesthetized using sterile technique with 1% Lidocaine.  A 22-gauge spinal needle was directed toward the inferior (6 o'clock) position of the pedicle, which formed the roof of the identified foramen.  Once in the epidural space, after negative aspiration for heme and CSF, 1 cc of Omnipaque contrast was injected under live fluoroscopy to confirm epidural location and assess filling defects and rule out intravascular needle placement. The following epidurogram observed: no intravascular or intrathecal flow pattern was noted.  No blood or CSF was aspirated. Contrast spread medially in epidural space.    After this, an injectate of 1 cc preservative free normal saline plus 6 mg of betamethasone and 1 cc of 0.25% bupivacaine was injected in the epidural space.  The right L4-5 neural foramen was then identified on right oblique "moisés dog" anatomical view at the 6 o'clock position using fluoroscopic guidance, and the area was marked. The overlying skin and subcutaneous structures were anesthetized using sterile technique with 1% Lidocaine.  A 22-gauge spinal needle was directed toward the inferior (6 o'clock) position of the pedicle, which formed the roof of the identified foramen.  Once in the epidural space, after negative aspiration for heme and CSF, 1cc of Omnipaque contrast was injected under live fluoroscopy to confirm epidural location and assess filling defects and rule out intravascular needle placement. The following epidurogram observed: no intravascular or intrathecal flow pattern was noted.  No blood or CSF was aspirated. Contrast spread medially in epidural space.    After this, an injectate of 1 cc preservative free normal saline plus 6 mg of betamethasone and 1 cc of 0.25% bupivacaine was injected in the epidural space.  The needle was subsequently removed.  Vital signs remained normal.  Pulse oximeter was used throughout the procedure and the patient's pulse and oxygen saturation remained within normal limits.  The patient tolerated the procedure well.  There were no complications.  The patient was instructed to apply ice over the injection sites for twenty minutes every two hours for the next 24 to 48 hours.  The patient was also instructed to contact me immediately if there were any problems.  Sincere Lockhart D.O.

## 2024-03-04 ENCOUNTER — APPOINTMENT (OUTPATIENT)
Dept: PAIN MANAGEMENT | Facility: CLINIC | Age: 54
End: 2024-03-04
Payer: COMMERCIAL

## 2024-03-04 VITALS — BODY MASS INDEX: 29.03 KG/M2 | HEIGHT: 67 IN | WEIGHT: 185 LBS

## 2024-03-04 PROCEDURE — 99213 OFFICE O/P EST LOW 20 MIN: CPT

## 2024-03-04 NOTE — DISCUSSION/SUMMARY
[de-identified] : After discussing various treatment options with the patient including but not limited to oral medications, physical therapy, exercise modalities as well as interventional spinal injections, we have decided with the following plan:  - Continue home exercises, stretching, activity modification, physical therapy, and conservative care. - Follow-up as needed. - Will provide prescription for Physical Therapy.

## 2024-03-04 NOTE — HISTORY OF PRESENT ILLNESS
[Lower back] : lower back [Sharp] : sharp [Shooting] : shooting [Household chores] : household chores [Nothing helps with pain getting better] : Nothing helps with pain getting better [Standing] : standing [Walking] : walking [6] : 6 [Intermittent] : intermittent [FreeTextEntry1] : 03/04/2024: s/p RIGHT L3-4 L4-5 TFESI on 02/15/24 with >90% relief and improvement of ADLs. Still has some tingling down the right groin/anterior thigh but has no pain.   Initial HPI 01/26/2024: Pain started a few months ago and recently worsening. Pain is on the right lateral hip and radiates into the right groin and down the right anterior thigh to the knee described as a sharp burning pain with associated numbness and tingling. Also gives out with walking. Saw Dr. Joseph who recommended right L3-4 MEERA.   MRI Lumbar Spine 10/12/23 independently reviewed: L2-3 mild stenosis and right L3 impingement; L3-4 HNP with right L4 impingement.  MRI Right Hip 1/17/24 independently reviewed: mild/moderate arthritis with labral tearing Conservative Care: has been doing PT with no relief  Pain Medications: completed MDP with good relief; tylenol/aleve PRN; did not like gabapentin bc made her drowsy and didn't help; Tramadol with no relief  Past Injections: Jorge in the past  Spine surgery: Cervical and lumbar spine surgery; L5-S1 right discectomy/hemilaminectomy  Blood thinners: none [] : no [FreeTextEntry7] : rt hip  [de-identified] : l mri  [TWNoteComboBox1] : 90%

## 2024-03-04 NOTE — PHYSICAL EXAM
[de-identified] : Constitutional; Appears well, no apparent distress Ability to communicate: Normal  Respiratory: non-labored breathing Skin: No rash noted Head: Normocephalic, atraumatic Neck: no visible thyroid enlargement Eyes: Extraocular movements intact Neurologic: Alert and oriented x3 Psychiatric: normal mood, affect and behavior [] : non-antalgic

## 2024-05-13 ENCOUNTER — APPOINTMENT (OUTPATIENT)
Dept: PAIN MANAGEMENT | Facility: CLINIC | Age: 54
End: 2024-05-13
Payer: COMMERCIAL

## 2024-05-13 VITALS — HEIGHT: 67 IN | WEIGHT: 185 LBS | BODY MASS INDEX: 29.03 KG/M2

## 2024-05-13 DIAGNOSIS — M54.16 RADICULOPATHY, LUMBAR REGION: ICD-10-CM

## 2024-05-13 DIAGNOSIS — M47.816 SPONDYLOSIS W/OUT MYELOPATHY OR RADICULOPATHY, LUMBAR REGION: ICD-10-CM

## 2024-05-13 PROCEDURE — 20552 NJX 1/MLT TRIGGER POINT 1/2: CPT

## 2024-05-13 PROCEDURE — 99213 OFFICE O/P EST LOW 20 MIN: CPT | Mod: 25

## 2024-05-13 PROCEDURE — J3490M: CUSTOM

## 2024-05-13 NOTE — DISCUSSION/SUMMARY
[de-identified] : After discussing various treatment options with the patient including but not limited to oral medications, physical therapy, exercise modalities as well as interventional spinal injections, we have decided with the following plan:  - Continue home exercises, stretching, activity modification, physical therapy, and conservative care. - Follow-up as needed.

## 2024-05-13 NOTE — PROCEDURE
[FreeTextEntry3] : Procedure Name: Trigger Point Injection (1-2 muscle groups): Celestone and Marcaine Trigger Point was performed because of pain.  Celestone: An injection of Celestone 6 mg Marcaine: An injection of Marcaine 10 mg Medication was injected in the Bilateral Gluteus Markie muscle  Patient has tried OTC's including aspirin, Ibuprofen, Aleve etc or prescription NSAIDS, and/or exercises at home and/ or physical therapy without satisfactory response. After verbal consent using sterile preparation and technique.The risks, benefits, and alternatives to cortisone injection were explained in full to the patient. Risks outlined include but are not limited to infection, sepsis, bleeding, scarring, skin discoloration, temporary increase in pain, syncopal episode, failure to resolve symptoms, allergic reaction, symptom recurrence, and elevation of blood sugar in diabetics. Patient understood the risks. All questions were answered. After discussion of options, patient requested an injection. Oral informed consent was obtained and sterile prep was done of the injection site. Sterile technique was utilized for the procedure including the preparation of the solutions used for the injection. Patient tolerated the procedure well. Advised to ice the injection site this evening. Prep with alcohol locally to site. Sterile technique used.

## 2024-05-13 NOTE — PHYSICAL EXAM
[de-identified] : Constitutional; Appears well, no apparent distress Ability to communicate: Normal  Respiratory: non-labored breathing Skin: No rash noted Head: Normocephalic, atraumatic Neck: no visible thyroid enlargement Eyes: Extraocular movements intact Neurologic: Alert and oriented x3 Psychiatric: normal mood, affect and behavior [] : non-antalgic

## 2024-05-13 NOTE — HISTORY OF PRESENT ILLNESS
[Lower back] : lower back [6] : 6 [Sharp] : sharp [Shooting] : shooting [Intermittent] : intermittent [Household chores] : household chores [Nothing helps with pain getting better] : Nothing helps with pain getting better [Standing] : standing [Walking] : walking [FreeTextEntry1] : 05/13/2024: Has been doing PT and the physical therapist recommended possible piriformis injections vs Sacroiliac injection. Pain is now in the b/l buttocks described as a sharp achy pain.   03/04/2024: s/p RIGHT L3-4 L4-5 TFESI on 02/15/24 with >90% relief and improvement of ADLs. Still has some tingling down the right groin/anterior thigh but has no pain.   Initial HPI 01/26/2024: Pain started a few months ago and recently worsening. Pain is on the right lateral hip and radiates into the right groin and down the right anterior thigh to the knee described as a sharp burning pain with associated numbness and tingling. Also gives out with walking. Saw Dr. Joseph who recommended right L3-4 MEERA.   MRI Lumbar Spine 10/12/23 independently reviewed: L2-3 mild stenosis and right L3 impingement; L3-4 HNP with right L4 impingement.  MRI Right Hip 1/17/24 independently reviewed: mild/moderate arthritis with labral tearing Conservative Care: has been doing PT with no relief  Pain Medications: completed MDP with good relief; tylenol/aleve PRN; did not like gabapentin bc made her drowsy and didn't help; Tramadol with no relief  Past Injections: Jorge in the past  Spine surgery: Cervical and lumbar spine surgery; L5-S1 right discectomy/hemilaminectomy  Blood thinners: none [] : no [FreeTextEntry7] : rt hip  [de-identified] : l mri

## 2024-06-24 ENCOUNTER — APPOINTMENT (OUTPATIENT)
Dept: PAIN MANAGEMENT | Facility: CLINIC | Age: 54
End: 2024-06-24
Payer: COMMERCIAL

## 2024-06-24 VITALS — WEIGHT: 185 LBS | HEIGHT: 67 IN | BODY MASS INDEX: 29.03 KG/M2

## 2024-06-24 DIAGNOSIS — M54.2 CERVICALGIA: ICD-10-CM

## 2024-06-24 DIAGNOSIS — M79.10 MYALGIA, UNSPECIFIED SITE: ICD-10-CM

## 2024-06-24 PROCEDURE — 99213 OFFICE O/P EST LOW 20 MIN: CPT

## 2024-08-05 ENCOUNTER — APPOINTMENT (OUTPATIENT)
Dept: PAIN MANAGEMENT | Facility: CLINIC | Age: 54
End: 2024-08-05

## 2024-08-05 NOTE — PROCEDURE
[FreeTextEntry3] : Procedure Name: Trigger Point Injection (1-2 muscle groups): Celestone and Marcaine Trigger Point was performed because of pain.  Celestone: An injection of Celestone 6 mg Marcaine: An injection of Marcaine 10 mg Medication was injected in the Bilateral Trapezius muscle.   Patient has tried OTC's including aspirin, Ibuprofen, Aleve etc or prescription NSAIDS, and/or exercises at home and/ or physical therapy without satisfactory response. After verbal consent using sterile preparation and technique.The risks, benefits, and alternatives to cortisone injection were explained in full to the patient. Risks outlined include but are not limited to infection, sepsis, bleeding, scarring, skin discoloration, temporary increase in pain, syncopal episode, failure to resolve symptoms, allergic reaction, symptom recurrence, and elevation of blood sugar in diabetics. Patient understood the risks. All questions were answered. After discussion of options, patient requested an injection. Oral informed consent was obtained and sterile prep was done of the injection site. Sterile technique was utilized for the procedure including the preparation of the solutions used for the injection. Patient tolerated the procedure well. Advised to ice the injection site this evening. Prep with alcohol locally to site. Sterile technique used.

## 2024-08-05 NOTE — PHYSICAL EXAM
[de-identified] : Constitutional; Appears well, no apparent distress Ability to communicate: Normal  Respiratory: non-labored breathing Skin: No rash noted Head: Normocephalic, atraumatic Neck: no visible thyroid enlargement Eyes: Extraocular movements intact Neurologic: Alert and oriented x3 Psychiatric: normal mood, affect and behavior [] : negative sitting straight leg raise

## 2024-08-05 NOTE — HISTORY OF PRESENT ILLNESS
[Lower back] : lower back [6] : 6 [Sharp] : sharp [Shooting] : shooting [Intermittent] : intermittent [Household chores] : household chores [Nothing helps with pain getting better] : Nothing helps with pain getting better [Standing] : standing [Walking] : walking [FreeTextEntry1] : 08/05/2024 : s/p RT L3-4 L4-5 TFESI on 02/15/24  with 50% relief and improvement of ADLS  06/24/2024: Pt would like a TPI. Pain is across the neck described as a tightness. Has tried heat and massage.   05/13/2024: Has been doing PT and the physical therapist recommended possible piriformis injections vs Sacroiliac injection. Pain is now in the b/l buttocks described as a sharp achy pain.   03/04/2024: s/p RIGHT L3-4 L4-5 TFESI on 02/15/24 with >90% relief and improvement of ADLs. Still has some tingling down the right groin/anterior thigh but has no pain.   Initial HPI 01/26/2024: Pain started a few months ago and recently worsening. Pain is on the right lateral hip and radiates into the right groin and down the right anterior thigh to the knee described as a sharp burning pain with associated numbness and tingling. Also gives out with walking. Saw Dr. Joseph who recommended right L3-4 MEERA.   MRI Lumbar Spine 10/12/23 independently reviewed: L2-3 mild stenosis and right L3 impingement; L3-4 HNP with right L4 impingement.  MRI Right Hip 1/17/24 independently reviewed: mild/moderate arthritis with labral tearing Conservative Care: has been doing PT with no relief  Pain Medications: completed MDP with good relief; tylenol/aleve PRN; did not like gabapentin bc made her drowsy and didn't help; Tramadol with no relief  Past Injections: Jorge in the past  Spine surgery: Cervical and lumbar spine surgery; L5-S1 right discectomy/hemilaminectomy  Blood thinners: none [] : no [FreeTextEntry7] : rt hip  [de-identified] : l mri

## 2024-08-19 ENCOUNTER — APPOINTMENT (OUTPATIENT)
Dept: PAIN MANAGEMENT | Facility: CLINIC | Age: 54
End: 2024-08-19
Payer: COMMERCIAL

## 2024-08-19 VITALS — HEIGHT: 67 IN | BODY MASS INDEX: 29.03 KG/M2 | WEIGHT: 185 LBS

## 2024-08-19 DIAGNOSIS — M79.10 MYALGIA, UNSPECIFIED SITE: ICD-10-CM

## 2024-08-19 DIAGNOSIS — M54.2 CERVICALGIA: ICD-10-CM

## 2024-08-19 PROCEDURE — J3490M: CUSTOM

## 2024-08-19 PROCEDURE — 99213 OFFICE O/P EST LOW 20 MIN: CPT | Mod: 25

## 2024-08-19 PROCEDURE — 20552 NJX 1/MLT TRIGGER POINT 1/2: CPT

## 2024-08-19 NOTE — HISTORY OF PRESENT ILLNESS
[Lower back] : lower back [6] : 6 [Sharp] : sharp [Shooting] : shooting [Intermittent] : intermittent [Household chores] : household chores [Nothing helps with pain getting better] : Nothing helps with pain getting better [Standing] : standing [Walking] : walking [FreeTextEntry1] : 08/19/2024: Pain is across the neck. Would like TPI today.  06/24/2024: Pt would like a TPI. Pain is across the neck described as a tightness. Has tried heat and massage.   05/13/2024: Has been doing PT and the physical therapist recommended possible piriformis injections vs Sacroiliac injection. Pain is now in the b/l buttocks described as a sharp achy pain.   03/04/2024: s/p RIGHT L3-4 L4-5 TFESI on 02/15/24 with >90% relief and improvement of ADLs. Still has some tingling down the right groin/anterior thigh but has no pain.   Initial HPI 01/26/2024: Pain started a few months ago and recently worsening. Pain is on the right lateral hip and radiates into the right groin and down the right anterior thigh to the knee described as a sharp burning pain with associated numbness and tingling. Also gives out with walking. Saw Dr. Joseph who recommended right L3-4 MEERA.   MRI Lumbar Spine 10/12/23 independently reviewed: L2-3 mild stenosis and right L3 impingement; L3-4 HNP with right L4 impingement.  MRI Right Hip 1/17/24 independently reviewed: mild/moderate arthritis with labral tearing Conservative Care: has been doing PT with no relief  Pain Medications: completed MDP with good relief; tylenol/aleve PRN; did not like gabapentin bc made her drowsy and didn't help; Tramadol with no relief  Past Injections: LESIs in the past  Spine surgery: Cervical and lumbar spine surgery; L5-S1 right discectomy/hemilaminectomy  Blood thinners: none [] : no [FreeTextEntry7] : rt hip  [de-identified] : l mri

## 2024-08-19 NOTE — PHYSICAL EXAM
[de-identified] : Constitutional; Appears well, no apparent distress Ability to communicate: Normal  Respiratory: non-labored breathing Skin: No rash noted Head: Normocephalic, atraumatic Neck: no visible thyroid enlargement Eyes: Extraocular movements intact Neurologic: Alert and oriented x3 Psychiatric: normal mood, affect and behavior [] : non-antalgic

## 2024-08-19 NOTE — HISTORY OF PRESENT ILLNESS
[Lower back] : lower back [6] : 6 [Sharp] : sharp [Shooting] : shooting [Intermittent] : intermittent [Household chores] : household chores [Nothing helps with pain getting better] : Nothing helps with pain getting better [Standing] : standing [Walking] : walking [FreeTextEntry1] : 08/19/2024: Pain is across the neck. Would like TPI today.  06/24/2024: Pt would like a TPI. Pain is across the neck described as a tightness. Has tried heat and massage.   05/13/2024: Has been doing PT and the physical therapist recommended possible piriformis injections vs Sacroiliac injection. Pain is now in the b/l buttocks described as a sharp achy pain.   03/04/2024: s/p RIGHT L3-4 L4-5 TFESI on 02/15/24 with >90% relief and improvement of ADLs. Still has some tingling down the right groin/anterior thigh but has no pain.   Initial HPI 01/26/2024: Pain started a few months ago and recently worsening. Pain is on the right lateral hip and radiates into the right groin and down the right anterior thigh to the knee described as a sharp burning pain with associated numbness and tingling. Also gives out with walking. Saw Dr. Joseph who recommended right L3-4 MEERA.   MRI Lumbar Spine 10/12/23 independently reviewed: L2-3 mild stenosis and right L3 impingement; L3-4 HNP with right L4 impingement.  MRI Right Hip 1/17/24 independently reviewed: mild/moderate arthritis with labral tearing Conservative Care: has been doing PT with no relief  Pain Medications: completed MDP with good relief; tylenol/aleve PRN; did not like gabapentin bc made her drowsy and didn't help; Tramadol with no relief  Past Injections: LESIs in the past  Spine surgery: Cervical and lumbar spine surgery; L5-S1 right discectomy/hemilaminectomy  Blood thinners: none [] : no [FreeTextEntry7] : rt hip  [de-identified] : l mri

## 2024-08-19 NOTE — DISCUSSION/SUMMARY
[de-identified] : After discussing various treatment options with the patient including but not limited to oral medications, physical therapy, exercise modalities as well as interventional spinal injections, we have decided with the following plan:  - Continue home exercises, stretching, activity modification, physical therapy, and conservative care. - Follow-up as needed.

## 2024-08-19 NOTE — DISCUSSION/SUMMARY
[de-identified] : After discussing various treatment options with the patient including but not limited to oral medications, physical therapy, exercise modalities as well as interventional spinal injections, we have decided with the following plan:  - Continue home exercises, stretching, activity modification, physical therapy, and conservative care. - Follow-up as needed.

## 2024-08-19 NOTE — PHYSICAL EXAM
[de-identified] : Constitutional; Appears well, no apparent distress Ability to communicate: Normal  Respiratory: non-labored breathing Skin: No rash noted Head: Normocephalic, atraumatic Neck: no visible thyroid enlargement Eyes: Extraocular movements intact Neurologic: Alert and oriented x3 Psychiatric: normal mood, affect and behavior [] : non-antalgic

## 2024-10-01 ENCOUNTER — APPOINTMENT (OUTPATIENT)
Dept: PAIN MANAGEMENT | Facility: CLINIC | Age: 54
End: 2024-10-01
Payer: COMMERCIAL

## 2024-10-01 VITALS — HEIGHT: 67 IN | BODY MASS INDEX: 29.35 KG/M2 | WEIGHT: 187 LBS

## 2024-10-01 DIAGNOSIS — M79.10 MYALGIA, UNSPECIFIED SITE: ICD-10-CM

## 2024-10-01 DIAGNOSIS — M54.2 CERVICALGIA: ICD-10-CM

## 2024-10-01 DIAGNOSIS — M47.816 SPONDYLOSIS W/OUT MYELOPATHY OR RADICULOPATHY, LUMBAR REGION: ICD-10-CM

## 2024-10-01 PROCEDURE — J3490M: CUSTOM

## 2024-10-01 PROCEDURE — 20552 NJX 1/MLT TRIGGER POINT 1/2: CPT

## 2024-10-01 PROCEDURE — 99214 OFFICE O/P EST MOD 30 MIN: CPT | Mod: 25

## 2024-10-01 NOTE — PROCEDURE
[FreeTextEntry3] : Procedure Name: Trigger Point Injection (1-2 muscle groups): Celestone and Marcaine Trigger Point was performed because of pain.  Celestone: An injection of Celestone 6 mg Marcaine: An injection of Marcaine 10 mg Medication was injected in the Left Lumbar Paraspinal muscle.   Patient has tried OTC's including aspirin, Ibuprofen, Aleve etc or prescription NSAIDS, and/or exercises at home and/ or physical therapy without satisfactory response. After verbal consent using sterile preparation and technique.The risks, benefits, and alternatives to cortisone injection were explained in full to the patient. Risks outlined include but are not limited to infection, sepsis, bleeding, scarring, skin discoloration, temporary increase in pain, syncopal episode, failure to resolve symptoms, allergic reaction, symptom recurrence, and elevation of blood sugar in diabetics. Patient understood the risks. All questions were answered. After discussion of options, patient requested an injection. Oral informed consent was obtained and sterile prep was done of the injection site. Sterile technique was utilized for the procedure including the preparation of the solutions used for the injection. Patient tolerated the procedure well. Advised to ice the injection site this evening. Prep with alcohol locally to site. Sterile technique used.

## 2024-10-01 NOTE — DISCUSSION/SUMMARY
[de-identified] : After discussing various treatment options with the patient including but not limited to oral medications, physical therapy, exercise modalities as well as interventional spinal injections, we have decided with the following plan:  - Continue home exercises, stretching, activity modification, physical therapy, and conservative care. - Follow-up as needed. - Continue Aleve PRN. Potential adverse effects of NSAIDs including but not limited to bleeding, ulcers, increased risk of hypertension, heart disease, kidney disease and stroke were discussed with the patient who understands risks. Patient advised to speak to internist or PMD if any problems with heart, blood pressure, or GI system exists before starting medications. Medication is allowing pt to be more mobile and perform ADLs. Will continue to monitor patient and attempt to wean as soon as possible.

## 2024-10-01 NOTE — HISTORY OF PRESENT ILLNESS
[Lower back] : lower back [6] : 6 [Sharp] : sharp [Shooting] : shooting [Household chores] : household chores [Standing] : standing [Walking] : walking [Frequent] : frequent [Social interactions] : social interactions [Rest] : rest [Heat] : heat [Injection therapy] : injection therapy [Bending forward] : bending forward [FreeTextEntry1] : 910/1/24: Pain is now on the left lower back described as a sharp tight pain. Would like TPI today for lower back. Neck pain is much better since last TPI.   08/19/2024: Pain is across the neck. Would like TPI today.  06/24/2024: Pt would like a TPI. Pain is across the neck described as a tightness. Has tried heat and massage.   05/13/2024: Has been doing PT and the physical therapist recommended possible piriformis injections vs Sacroiliac injection. Pain is now in the b/l buttocks described as a sharp achy pain.   03/04/2024: s/p RIGHT L3-4 L4-5 TFESI on 02/15/24 with >90% relief and improvement of ADLs. Still has some tingling down the right groin/anterior thigh but has no pain.   Initial HPI 01/26/2024: Pain started a few months ago and recently worsening. Pain is on the right lateral hip and radiates into the right groin and down the right anterior thigh to the knee described as a sharp burning pain with associated numbness and tingling. Also gives out with walking. Saw Dr. Joseph who recommended right L3-4 MEERA.   MRI Lumbar Spine 10/12/23 independently reviewed: L2-3 mild stenosis and right L3 impingement; L3-4 HNP with right L4 impingement.  MRI Right Hip 1/17/24 independently reviewed: mild/moderate arthritis with labral tearing Conservative Care: has been doing PT with no relief  Pain Medications: completed MDP with good relief; tylenol/aleve PRN; did not like gabapentin bc made her drowsy and didn't help; Tramadol with no relief  Past Injections: LESIs in the past  Spine surgery: Cervical and lumbar spine surgery; L5-S1 right discectomy/hemilaminectomy  Blood thinners: none [] : no [FreeTextEntry7] : rt hip  [de-identified] : l mri

## 2024-10-01 NOTE — PHYSICAL EXAM
[de-identified] : Constitutional; Appears well, no apparent distress Ability to communicate: Normal  Respiratory: non-labored breathing Skin: No rash noted Head: Normocephalic, atraumatic Neck: no visible thyroid enlargement Eyes: Extraocular movements intact Neurologic: Alert and oriented x3 Psychiatric: normal mood, affect and behavior [] : non-antalgic

## 2024-11-05 ENCOUNTER — DOCTOR'S OFFICE (OUTPATIENT)
Facility: LOCATION | Age: 54
Setting detail: OPHTHALMOLOGY
End: 2024-11-05
Payer: COMMERCIAL

## 2024-11-05 DIAGNOSIS — H01.004: ICD-10-CM

## 2024-11-05 DIAGNOSIS — H52.7: ICD-10-CM

## 2024-11-05 DIAGNOSIS — H00.12: ICD-10-CM

## 2024-11-05 DIAGNOSIS — H01.001: ICD-10-CM

## 2024-11-05 PROCEDURE — 92002 INTRM OPH EXAM NEW PATIENT: CPT | Performed by: STUDENT IN AN ORGANIZED HEALTH CARE EDUCATION/TRAINING PROGRAM

## 2024-11-05 ASSESSMENT — REFRACTION_CURRENTRX
OD_OVR_VA: 20/
OD_AXIS: 133
OD_ADD: +0.50
OS_SPHERE: -2.50
OS_AXIS: 037
OD_SPHERE: -2.50
OS_CYLINDER: +0.75
OS_OVR_VA: 20/
OD_CYLINDER: +0.50
OS_ADD: +1.25

## 2024-11-05 ASSESSMENT — REFRACTION_AUTOREFRACTION
OS_CYLINDER: -0.25
OS_AXIS: 094
OD_CYLINDER: -0.25
OD_AXIS: 152
OS_SPHERE: -2.00
OD_SPHERE: -2.75

## 2024-11-05 ASSESSMENT — VISUAL ACUITY
OD_BCVA: 20/80
OS_BCVA: 20/70

## 2024-11-05 ASSESSMENT — KERATOMETRY
OD_K2POWER_DIOPTERS: 44.50
OS_K1POWER_DIOPTERS: 44.50
OS_K2POWER_DIOPTERS: 45.25
OD_AXISANGLE_DEGREES: 089
OS_AXISANGLE_DEGREES: 079
OD_K1POWER_DIOPTERS: 43.75

## 2024-11-05 ASSESSMENT — LID EXAM ASSESSMENTS
OS_BLEPHARITIS: T
OD_BLEPHARITIS: T

## 2024-11-05 ASSESSMENT — CONFRONTATIONAL VISUAL FIELD TEST (CVF)
OS_FINDINGS: FULL
OD_FINDINGS: FULL

## 2024-11-25 ENCOUNTER — APPOINTMENT (OUTPATIENT)
Dept: PAIN MANAGEMENT | Facility: CLINIC | Age: 54
End: 2024-11-25
Payer: COMMERCIAL

## 2024-11-25 VITALS — WEIGHT: 186 LBS | BODY MASS INDEX: 29.19 KG/M2 | HEIGHT: 67 IN

## 2024-11-25 DIAGNOSIS — M54.16 RADICULOPATHY, LUMBAR REGION: ICD-10-CM

## 2024-11-25 DIAGNOSIS — M79.10 MYALGIA, UNSPECIFIED SITE: ICD-10-CM

## 2024-11-25 DIAGNOSIS — M47.816 SPONDYLOSIS W/OUT MYELOPATHY OR RADICULOPATHY, LUMBAR REGION: ICD-10-CM

## 2024-11-25 PROCEDURE — 20552 NJX 1/MLT TRIGGER POINT 1/2: CPT

## 2024-11-25 PROCEDURE — 99213 OFFICE O/P EST LOW 20 MIN: CPT | Mod: 25

## 2024-11-25 PROCEDURE — J3490M: CUSTOM

## 2024-12-30 ENCOUNTER — APPOINTMENT (OUTPATIENT)
Dept: PAIN MANAGEMENT | Facility: CLINIC | Age: 54
End: 2024-12-30
Payer: COMMERCIAL

## 2024-12-30 VITALS — HEIGHT: 67 IN | BODY MASS INDEX: 29.51 KG/M2 | WEIGHT: 188 LBS

## 2024-12-30 DIAGNOSIS — M79.10 MYALGIA, UNSPECIFIED SITE: ICD-10-CM

## 2024-12-30 DIAGNOSIS — M54.16 RADICULOPATHY, LUMBAR REGION: ICD-10-CM

## 2024-12-30 DIAGNOSIS — M47.816 SPONDYLOSIS W/OUT MYELOPATHY OR RADICULOPATHY, LUMBAR REGION: ICD-10-CM

## 2024-12-30 PROCEDURE — J3490M: CUSTOM

## 2024-12-30 PROCEDURE — 20552 NJX 1/MLT TRIGGER POINT 1/2: CPT

## 2024-12-30 PROCEDURE — 99213 OFFICE O/P EST LOW 20 MIN: CPT | Mod: 25

## 2025-02-10 ENCOUNTER — NON-APPOINTMENT (OUTPATIENT)
Age: 55
End: 2025-02-10

## 2025-02-10 ENCOUNTER — APPOINTMENT (OUTPATIENT)
Dept: ORTHOPEDIC SURGERY | Facility: CLINIC | Age: 55
End: 2025-02-10
Payer: COMMERCIAL

## 2025-02-10 VITALS — BODY MASS INDEX: 29.03 KG/M2 | WEIGHT: 185 LBS | HEIGHT: 67 IN

## 2025-02-10 DIAGNOSIS — G89.29 PAIN IN LEFT KNEE: ICD-10-CM

## 2025-02-10 DIAGNOSIS — M25.562 PAIN IN LEFT KNEE: ICD-10-CM

## 2025-02-10 PROCEDURE — 99203 OFFICE O/P NEW LOW 30 MIN: CPT

## 2025-02-15 ENCOUNTER — APPOINTMENT (OUTPATIENT)
Dept: MRI IMAGING | Facility: CLINIC | Age: 55
End: 2025-02-15
Payer: COMMERCIAL

## 2025-02-15 ENCOUNTER — OUTPATIENT (OUTPATIENT)
Dept: OUTPATIENT SERVICES | Facility: HOSPITAL | Age: 55
LOS: 1 days | End: 2025-02-15
Payer: COMMERCIAL

## 2025-02-15 DIAGNOSIS — M25.562 PAIN IN LEFT KNEE: ICD-10-CM

## 2025-02-15 DIAGNOSIS — Z90.49 ACQUIRED ABSENCE OF OTHER SPECIFIED PARTS OF DIGESTIVE TRACT: Chronic | ICD-10-CM

## 2025-02-15 DIAGNOSIS — Z98.890 OTHER SPECIFIED POSTPROCEDURAL STATES: Chronic | ICD-10-CM

## 2025-02-15 PROCEDURE — 73721 MRI JNT OF LWR EXTRE W/O DYE: CPT

## 2025-02-15 PROCEDURE — 73721 MRI JNT OF LWR EXTRE W/O DYE: CPT | Mod: 26,LT

## 2025-02-24 ENCOUNTER — APPOINTMENT (OUTPATIENT)
Dept: ORTHOPEDIC SURGERY | Facility: CLINIC | Age: 55
End: 2025-02-24
Payer: COMMERCIAL

## 2025-02-24 DIAGNOSIS — M71.22 SYNOVIAL CYST OF POPLITEAL SPACE [BAKER], LEFT KNEE: ICD-10-CM

## 2025-02-24 DIAGNOSIS — S83.242A OTHER TEAR OF MEDIAL MENISCUS, CURRENT INJURY, LEFT KNEE, INITIAL ENCOUNTER: ICD-10-CM

## 2025-02-24 PROCEDURE — 99213 OFFICE O/P EST LOW 20 MIN: CPT

## 2025-03-10 ENCOUNTER — APPOINTMENT (OUTPATIENT)
Dept: PAIN MANAGEMENT | Facility: CLINIC | Age: 55
End: 2025-03-10
Payer: COMMERCIAL

## 2025-03-10 VITALS — BODY MASS INDEX: 29.03 KG/M2 | HEIGHT: 67 IN | WEIGHT: 185 LBS

## 2025-03-10 DIAGNOSIS — M54.16 RADICULOPATHY, LUMBAR REGION: ICD-10-CM

## 2025-03-10 DIAGNOSIS — M47.816 SPONDYLOSIS W/OUT MYELOPATHY OR RADICULOPATHY, LUMBAR REGION: ICD-10-CM

## 2025-03-10 DIAGNOSIS — M79.10 MYALGIA, UNSPECIFIED SITE: ICD-10-CM

## 2025-03-10 PROCEDURE — J3490M: CUSTOM

## 2025-03-10 PROCEDURE — 20552 NJX 1/MLT TRIGGER POINT 1/2: CPT

## 2025-03-10 PROCEDURE — 99214 OFFICE O/P EST MOD 30 MIN: CPT | Mod: 25

## 2025-04-15 ENCOUNTER — APPOINTMENT (OUTPATIENT)
Dept: PAIN MANAGEMENT | Facility: CLINIC | Age: 55
End: 2025-04-15

## 2025-05-05 ENCOUNTER — APPOINTMENT (OUTPATIENT)
Dept: PAIN MANAGEMENT | Facility: CLINIC | Age: 55
End: 2025-05-05

## 2025-06-10 ENCOUNTER — APPOINTMENT (OUTPATIENT)
Dept: PAIN MANAGEMENT | Facility: CLINIC | Age: 55
End: 2025-06-10

## 2025-06-24 ENCOUNTER — APPOINTMENT (OUTPATIENT)
Dept: PAIN MANAGEMENT | Facility: CLINIC | Age: 55
End: 2025-06-24